# Patient Record
Sex: MALE | Race: WHITE | NOT HISPANIC OR LATINO | URBAN - METROPOLITAN AREA
[De-identification: names, ages, dates, MRNs, and addresses within clinical notes are randomized per-mention and may not be internally consistent; named-entity substitution may affect disease eponyms.]

---

## 2020-02-25 ENCOUNTER — EMERGENCY (EMERGENCY)
Facility: HOSPITAL | Age: 60
LOS: 1 days | Discharge: HOME | End: 2020-02-25
Attending: EMERGENCY MEDICINE
Payer: COMMERCIAL

## 2020-02-25 VITALS
OXYGEN SATURATION: 99 % | DIASTOLIC BLOOD PRESSURE: 81 MMHG | SYSTOLIC BLOOD PRESSURE: 109 MMHG | RESPIRATION RATE: 18 BRPM | HEART RATE: 65 BPM

## 2020-02-25 VITALS — HEIGHT: 66 IN | WEIGHT: 169.98 LBS

## 2020-02-25 LAB
ALBUMIN SERPL ELPH-MCNC: 4.2 G/DL — SIGNIFICANT CHANGE UP (ref 3.3–5)
ALP SERPL-CCNC: 60 U/L — SIGNIFICANT CHANGE UP (ref 40–120)
ALT FLD-CCNC: 23 U/L — SIGNIFICANT CHANGE UP (ref 10–45)
ANION GAP SERPL CALC-SCNC: 14 MMOL/L — SIGNIFICANT CHANGE UP (ref 5–17)
APTT BLD: 28.9 SEC — SIGNIFICANT CHANGE UP (ref 27.5–36.3)
AST SERPL-CCNC: 23 U/L — SIGNIFICANT CHANGE UP (ref 10–40)
BASOPHILS # BLD AUTO: 0.03 K/UL — SIGNIFICANT CHANGE UP (ref 0–0.2)
BASOPHILS NFR BLD AUTO: 0.6 % — SIGNIFICANT CHANGE UP (ref 0–2)
BILIRUB SERPL-MCNC: 0.3 MG/DL — SIGNIFICANT CHANGE UP (ref 0.2–1.2)
BUN SERPL-MCNC: 17 MG/DL — SIGNIFICANT CHANGE UP (ref 7–23)
CALCIUM SERPL-MCNC: 9.2 MG/DL — SIGNIFICANT CHANGE UP (ref 8.4–10.5)
CHLORIDE SERPL-SCNC: 102 MMOL/L — SIGNIFICANT CHANGE UP (ref 96–108)
CO2 SERPL-SCNC: 23 MMOL/L — SIGNIFICANT CHANGE UP (ref 22–31)
CREAT SERPL-MCNC: 1.26 MG/DL — SIGNIFICANT CHANGE UP (ref 0.5–1.3)
EOSINOPHIL # BLD AUTO: 0.08 K/UL — SIGNIFICANT CHANGE UP (ref 0–0.5)
EOSINOPHIL NFR BLD AUTO: 1.6 % — SIGNIFICANT CHANGE UP (ref 0–6)
FLU A RESULT: SIGNIFICANT CHANGE UP
FLU A RESULT: SIGNIFICANT CHANGE UP
FLUAV AG NPH QL: SIGNIFICANT CHANGE UP
FLUBV AG NPH QL: DETECTED
GLUCOSE SERPL-MCNC: 132 MG/DL — HIGH (ref 70–99)
HCT VFR BLD CALC: 46.6 % — SIGNIFICANT CHANGE UP (ref 39–50)
HGB BLD-MCNC: 15.4 G/DL — SIGNIFICANT CHANGE UP (ref 13–17)
IMM GRANULOCYTES NFR BLD AUTO: 0.4 % — SIGNIFICANT CHANGE UP (ref 0–1.5)
INR BLD: 1.11 RATIO — SIGNIFICANT CHANGE UP (ref 0.88–1.16)
LYMPHOCYTES # BLD AUTO: 1.16 K/UL — SIGNIFICANT CHANGE UP (ref 1–3.3)
LYMPHOCYTES # BLD AUTO: 23.5 % — SIGNIFICANT CHANGE UP (ref 13–44)
MAGNESIUM SERPL-MCNC: 2.2 MG/DL — SIGNIFICANT CHANGE UP (ref 1.6–2.6)
MCHC RBC-ENTMCNC: 30.7 PG — SIGNIFICANT CHANGE UP (ref 27–34)
MCHC RBC-ENTMCNC: 33 GM/DL — SIGNIFICANT CHANGE UP (ref 32–36)
MCV RBC AUTO: 93 FL — SIGNIFICANT CHANGE UP (ref 80–100)
MONOCYTES # BLD AUTO: 0.94 K/UL — HIGH (ref 0–0.9)
MONOCYTES NFR BLD AUTO: 19.1 % — HIGH (ref 2–14)
NEUTROPHILS # BLD AUTO: 2.7 K/UL — SIGNIFICANT CHANGE UP (ref 1.8–7.4)
NEUTROPHILS NFR BLD AUTO: 54.8 % — SIGNIFICANT CHANGE UP (ref 43–77)
NRBC # BLD: 0 /100 WBCS — SIGNIFICANT CHANGE UP (ref 0–0)
NT-PROBNP SERPL-SCNC: 616 PG/ML — HIGH (ref 0–300)
PLATELET # BLD AUTO: 153 K/UL — SIGNIFICANT CHANGE UP (ref 150–400)
POTASSIUM SERPL-MCNC: 3.6 MMOL/L — SIGNIFICANT CHANGE UP (ref 3.5–5.3)
POTASSIUM SERPL-SCNC: 3.6 MMOL/L — SIGNIFICANT CHANGE UP (ref 3.5–5.3)
PROT SERPL-MCNC: 6.9 G/DL — SIGNIFICANT CHANGE UP (ref 6–8.3)
PROTHROM AB SERPL-ACNC: 12.7 SEC — SIGNIFICANT CHANGE UP (ref 10–12.9)
RBC # BLD: 5.01 M/UL — SIGNIFICANT CHANGE UP (ref 4.2–5.8)
RBC # FLD: 12.8 % — SIGNIFICANT CHANGE UP (ref 10.3–14.5)
RSV RESULT: SIGNIFICANT CHANGE UP
RSV RNA RESP QL NAA+PROBE: SIGNIFICANT CHANGE UP
SODIUM SERPL-SCNC: 139 MMOL/L — SIGNIFICANT CHANGE UP (ref 135–145)
TROPONIN T, HIGH SENSITIVITY RESULT: 6 NG/L — SIGNIFICANT CHANGE UP (ref 0–51)
TROPONIN T, HIGH SENSITIVITY RESULT: 8 NG/L — SIGNIFICANT CHANGE UP (ref 0–51)
WBC # BLD: 4.93 K/UL — SIGNIFICANT CHANGE UP (ref 3.8–10.5)
WBC # FLD AUTO: 4.93 K/UL — SIGNIFICANT CHANGE UP (ref 3.8–10.5)

## 2020-02-25 PROCEDURE — 93010 ELECTROCARDIOGRAM REPORT: CPT | Mod: 59

## 2020-02-25 PROCEDURE — 99285 EMERGENCY DEPT VISIT HI MDM: CPT

## 2020-02-25 PROCEDURE — 85730 THROMBOPLASTIN TIME PARTIAL: CPT

## 2020-02-25 PROCEDURE — 80053 COMPREHEN METABOLIC PANEL: CPT

## 2020-02-25 PROCEDURE — 75574 CT ANGIO HRT W/3D IMAGE: CPT

## 2020-02-25 PROCEDURE — 87631 RESP VIRUS 3-5 TARGETS: CPT

## 2020-02-25 PROCEDURE — 83735 ASSAY OF MAGNESIUM: CPT

## 2020-02-25 PROCEDURE — 93005 ELECTROCARDIOGRAM TRACING: CPT

## 2020-02-25 PROCEDURE — 83880 ASSAY OF NATRIURETIC PEPTIDE: CPT

## 2020-02-25 PROCEDURE — 84484 ASSAY OF TROPONIN QUANT: CPT

## 2020-02-25 PROCEDURE — 71046 X-RAY EXAM CHEST 2 VIEWS: CPT | Mod: 26

## 2020-02-25 PROCEDURE — 75574 CT ANGIO HRT W/3D IMAGE: CPT | Mod: 26

## 2020-02-25 PROCEDURE — 71046 X-RAY EXAM CHEST 2 VIEWS: CPT

## 2020-02-25 PROCEDURE — 85027 COMPLETE CBC AUTOMATED: CPT

## 2020-02-25 PROCEDURE — 99284 EMERGENCY DEPT VISIT MOD MDM: CPT | Mod: 25

## 2020-02-25 PROCEDURE — 85610 PROTHROMBIN TIME: CPT

## 2020-02-25 RX ORDER — SODIUM CHLORIDE 9 MG/ML
1000 INJECTION INTRAMUSCULAR; INTRAVENOUS; SUBCUTANEOUS ONCE
Refills: 0 | Status: COMPLETED | OUTPATIENT
Start: 2020-02-25 | End: 2020-02-25

## 2020-02-25 RX ORDER — METOPROLOL TARTRATE 50 MG
100 TABLET ORAL ONCE
Refills: 0 | Status: COMPLETED | OUTPATIENT
Start: 2020-02-25 | End: 2020-02-25

## 2020-02-25 RX ADMIN — Medication 100 MILLIGRAM(S): at 11:53

## 2020-02-25 RX ADMIN — SODIUM CHLORIDE 1000 MILLILITER(S): 9 INJECTION INTRAMUSCULAR; INTRAVENOUS; SUBCUTANEOUS at 15:53

## 2020-02-25 RX ADMIN — SODIUM CHLORIDE 1000 MILLILITER(S): 9 INJECTION INTRAMUSCULAR; INTRAVENOUS; SUBCUTANEOUS at 11:53

## 2020-02-25 NOTE — ED PROVIDER NOTE - ATTENDING CONTRIBUTION TO CARE
attending Adam: 59yM  no PMH BIBEMS from home after syncopal episode at home. Several days of generalized weakness. Today with episode of dizziness and diaphoresis, syncopized twice in armchair, no fall. No seizure like activity. Witnessed by wife. Reports increased stress recently related to work. Seen at urgent care yesterday for weakness and diagnosed with viral syndrome. +family h/o MI in his mother in her 70s. Last stress test 2 years ago. No chest pain. Given ASA and IVF by EMS with improved symptoms. Will obtain ekg, place on tele, labs, cxr, reassess.

## 2020-02-25 NOTE — ED PROVIDER NOTE - OBJECTIVE STATEMENT
60 y/o male with no PMHx with no PSx no daily med use brought in via EMS after witnessed syncopal event by wife x 2 episodes. Patient stated he felt dizzy prior to the event and wife reported pt was slumped in chair unconscious for 2-3 minutes. Patient has been feeling generally unwell for the last few days with mild non productive cough and body aches. Patient seen by Urgent Care who stated pt may have URI. Patient stated he has been stressed with work as he is a . Last stress test was 2 years ago with [place] cardiologist and mother  of MI at 78 years old. EMS was concerned for STEMI therefore pt given ASA 325mg en route and 500cc of IVF. Patient denied CP, SOB, ANTHONY, abdominal pain, N/V/D, fever chills, productive cough, recent travel or prolonged sedentary period, peripheral edema, similar event in the past, back pain, neck pain, smoking, alcohol use, urinary complaints

## 2020-02-25 NOTE — ED PROVIDER NOTE - CARE PROVIDER_API CALL
Johan Borrego (DO)  Cardiology; Internal Medicine  [address] [place] [address], [address]  [place] [address]  Phone: [phone]  Fax: [phone]  Follow Up Time: 4-6 Days

## 2020-02-25 NOTE — ED PROVIDER NOTE - PATIENT PORTAL LINK FT
You can access the FollowMyHealth Patient Portal offered by [facility] by registering at the following website: [email or web address]. By joining [facility]’s FollowMyHealth portal, you will also be able to view your health information using other applications (apps) compatible with our system.

## 2020-02-25 NOTE — ED ADULT NURSE REASSESSMENT NOTE - NS ED NURSE REASSESS COMMENT FT1
Pt observed sitting up in stretcher conversing with RN without difficulty. Breathing spontaneous and unlabored. Denies CP, SOB dizziness at this time. Maintained on continuous pulse ox and cardiac monitor, NSR.  No acute distress noted with wife at bedside.

## 2020-02-25 NOTE — ED ADULT NURSE NOTE - OBJECTIVE STATEMENT
Pt 58 y/o male AxOx3 presents to ED via EMS s/p 2 syncopal episodes at home witnessed by wife. Pt endorses remembering being very diaphoretic before episodes. Wife witnessed events and states that 10 minutes  each episode. Pt denies CP, SOB or dizziness at this time. [facility] and gave patient 324mg of Aspirin before arrival. Upon assessment, abdomen soft and nontender, +strong peripheral pulses, moving all extremities without difficulty, lungs clear. Pt is well appearing, speaking full sentences without difficulty. Breathing spontaneous and unlabored. Pt placed on continuous pulse ox and cardiac monitor, NSR noted. Safety and comfort measures initiated- bed placed in lowest position and side rails raised. Pt oriented to call bell system. Pt 58 y/o male AxOx3 presents to ED via EMS s/p 2 syncopal episodes at home witnessed by wife. Pt endorses remembering being very diaphoretic before episodes. Wife witnessed events and states that pt suddenly slumped down in chair, became alert again. Then 10 minute later episode #2 happened with similar symptoms. Pt denies CP, SOB or dizziness at this time. EMS placed 18G IV in left AC and gave patient 324mg of Aspirin before arrival. Upon assessment, abdomen soft and nontender, +strong peripheral pulses, moving all extremities without difficulty, lungs clear. Pt is well appearing, speaking full sentences without difficulty. Breathing spontaneous and unlabored. Pt placed on continuous pulse ox and cardiac monitor, NSR noted. Pt denies CP, SOB, HA, vision changes, n/v/d, chills, abdominal pain. Safety and comfort measures initiated- bed placed in lowest position and side rails raised. Pt oriented to call bell system.

## 2020-02-25 NOTE — ED PROVIDER NOTE - NSFOLLOWUPINSTRUCTIONS_ED_ALL_ED_FT
Follow up with your Primary Care Physician within the next 2-3 days  Bring a copy of your test results with you to your appointment  Continue your current medication regimen  Return to the Emergency Room if you experience new or worsening symptoms abdominal pain, passing out, nausea, vomiting, fever, cough, chest pain, shortness of breath  Establish care with cardiologist Dr. Johan Borrego [address] [place] [address], [address] [place] [address]  Phone: [phone]

## 2021-04-21 NOTE — ED PROVIDER NOTE - CARDIOVASCULAR [-], MLM
Implemented All Universal Safety Interventions:  [place] to call system. Call bell, personal items and telephone within reach. Instruct patient to call for assistance. Room bathroom lighting operational. Non-slip footwear when patient is off stretcher. Physically safe environment: no spills, clutter or unnecessary equipment. Stretcher in lowest position, wheels locked, appropriate side rails in place. no palpitations/no peripheral edema/no chest pain

## 2022-12-26 ENCOUNTER — NON-APPOINTMENT (OUTPATIENT)
Age: 62
End: 2022-12-26

## 2024-03-14 ENCOUNTER — NON-APPOINTMENT (OUTPATIENT)
Age: 64
End: 2024-03-14

## 2024-03-14 ENCOUNTER — APPOINTMENT (OUTPATIENT)
Dept: CARDIOLOGY | Facility: CLINIC | Age: 64
End: 2024-03-14
Payer: COMMERCIAL

## 2024-03-14 VITALS
HEIGHT: 68 IN | WEIGHT: 192 LBS | SYSTOLIC BLOOD PRESSURE: 132 MMHG | OXYGEN SATURATION: 98 % | DIASTOLIC BLOOD PRESSURE: 86 MMHG | BODY MASS INDEX: 29.1 KG/M2 | HEART RATE: 81 BPM | RESPIRATION RATE: 17 BRPM

## 2024-03-14 VITALS — DIASTOLIC BLOOD PRESSURE: 82 MMHG | SYSTOLIC BLOOD PRESSURE: 132 MMHG

## 2024-03-14 DIAGNOSIS — Z86.79 OTHER SPECIFIED POSTPROCEDURAL STATES: ICD-10-CM

## 2024-03-14 DIAGNOSIS — I25.84 ATHEROSCLEROTIC HEART DISEASE OF NATIVE CORONARY ARTERY W/OUT ANGINA PECTORIS: ICD-10-CM

## 2024-03-14 DIAGNOSIS — Z80.3 FAMILY HISTORY OF MALIGNANT NEOPLASM OF BREAST: ICD-10-CM

## 2024-03-14 DIAGNOSIS — R55 SYNCOPE AND COLLAPSE: ICD-10-CM

## 2024-03-14 DIAGNOSIS — Z78.9 OTHER SPECIFIED HEALTH STATUS: ICD-10-CM

## 2024-03-14 DIAGNOSIS — I07.1 RHEUMATIC TRICUSPID INSUFFICIENCY: ICD-10-CM

## 2024-03-14 DIAGNOSIS — I48.0 PAROXYSMAL ATRIAL FIBRILLATION: ICD-10-CM

## 2024-03-14 DIAGNOSIS — Z82.49 FAMILY HISTORY OF ISCHEMIC HEART DISEASE AND OTHER DISEASES OF THE CIRCULATORY SYSTEM: ICD-10-CM

## 2024-03-14 DIAGNOSIS — I25.10 ATHEROSCLEROTIC HEART DISEASE OF NATIVE CORONARY ARTERY W/OUT ANGINA PECTORIS: ICD-10-CM

## 2024-03-14 DIAGNOSIS — I34.0 NONRHEUMATIC MITRAL (VALVE) INSUFFICIENCY: ICD-10-CM

## 2024-03-14 DIAGNOSIS — Z80.0 FAMILY HISTORY OF MALIGNANT NEOPLASM OF DIGESTIVE ORGANS: ICD-10-CM

## 2024-03-14 DIAGNOSIS — Z83.3 FAMILY HISTORY OF DIABETES MELLITUS: ICD-10-CM

## 2024-03-14 DIAGNOSIS — Z98.890 OTHER SPECIFIED POSTPROCEDURAL STATES: ICD-10-CM

## 2024-03-14 DIAGNOSIS — G47.33 OBSTRUCTIVE SLEEP APNEA (ADULT) (PEDIATRIC): ICD-10-CM

## 2024-03-14 PROBLEM — Z00.00 ENCOUNTER FOR PREVENTIVE HEALTH EXAMINATION: Status: ACTIVE | Noted: 2024-03-14

## 2024-03-14 PROCEDURE — 93000 ELECTROCARDIOGRAM COMPLETE: CPT

## 2024-03-14 PROCEDURE — 99205 OFFICE O/P NEW HI 60 MIN: CPT | Mod: 25

## 2024-03-14 NOTE — PHYSICAL EXAM
[Well Developed] : well developed [Well Nourished] : well nourished [No Acute Distress] : no acute distress [Normal Conjunctiva] : normal conjunctiva [Normal Venous Pressure] : normal venous pressure [No Carotid Bruit] : no carotid bruit [Normal] : normal S1, S2, no murmur, no rub, no gallop [Normal S1, S2] : normal S1, S2 [No Murmur] : no murmur [No Rub] : no rub [No Gallop] : no gallop [Clear Lung Fields] : clear lung fields [Good Air Entry] : good air entry [Soft] : abdomen soft [No Respiratory Distress] : no respiratory distress  [Non Tender] : non-tender [No Masses/organomegaly] : no masses/organomegaly [Normal Bowel Sounds] : normal bowel sounds [Normal Gait] : normal gait [No Edema] : no edema [No Cyanosis] : no cyanosis [No Clubbing] : no clubbing [No Varicosities] : no varicosities [No Skin Lesions] : no skin lesions [No Rash] : no rash [Moves all extremities] : moves all extremities [No Focal Deficits] : no focal deficits [Normal Speech] : normal speech [Alert and Oriented] : alert and oriented [Normal memory] : normal memory

## 2024-03-17 NOTE — ASSESSMENT
[FreeTextEntry1] : ============================ Paroxysmal atrial fibrillation, status post cardioversion in November 2023 and ablation in January 2024 at [facility] by Dr. Orlando Rico.  Coronary artery calcification; calcification in the descending aorta  Possible hyperlipidemia.  Mild to moderate mitral insufficiency; mild tricuspid valve insufficiency.  Obstructive sleep apnea.  History of vasovagal syncope

## 2024-03-17 NOTE — DISCUSSION/SUMMARY
[EKG obtained to assist in diagnosis and management of assessed problem(s)] : EKG obtained to assist in diagnosis and management of assessed problem(s) [FreeTextEntry1] : EKG today shows:  Sinus Rhythm at 81 bpm.  Mild IVCD, otherwise unremarkable tracing  PLAN: 1.  Paroxysmal atrial fibrillation, s/p cardioversion in November 2023 and ablation in January 2024 at [facility] by Dr. Orlando Medina.   -   Will request copies of the inpatient records from [place] re cardioversion last November and ablation this past January 2 for review. -   At present, the reason for the atrial fibrillation is unclear; this might be related to obstructive sleep apnea and might simply be related to aging. -   He will keep a planned appointment with Dr. Medina in the spring.   -   Will request a copy of most recent blood work from Dr. Maciel.  2.   Coronary artery calcification; calcification in the descending aorta -   I explained to the patient and his wife that this finding is consistent with atherosclerosis affecting the coronary arteries.  At present, he is active without symptoms and does exercise regularly. -   he may continue his usual activities. -   I recommended that he start on enteric-coated aspirin at 81 mg daily given the finding of coronary atherosclerosis; may also have some utility should A-fib recur. -   At his next visit, after I have had the opportunity to further review the records from [facility], we will discuss further workup, either by cardiac CT angiogram or potentially nuclear stress testing. -   I explained that he likely should be treated with a statin based simply on the presence of atherosclerosis but will wait until I have an opportunity to review the blood work from Dr. Maciel.  3.  Possible hyperlipidemia. -   As above, will review recent blood work and decide on adding statin in the future.  4.   Mild to moderate mitral insufficiency; mild tricuspid valve insufficiency --   noted incidentally on echo at [facility].  Remains active and asymptomatic with preserved LV function.  No specific treatment needed at this time.  5.   Obstructive sleep apnea. -   Continue follow-up with pulmonary physician.  His wife reports that sometimes that she is concerned of a leak regarding his apparatus.  I advised that this would be best addressed by the pulmonary doctor and the people provide his positive pressure mask.  6.  History of vasovagal syncope  85 minutes spent on today's office visit including review of records provided by patient.  The patient will return for a visit in  6-8 weeks.

## 2024-03-17 NOTE — HISTORY OF PRESENT ILLNESS
[FreeTextEntry1] : He is 63 years old.  He has a hx. of GERDA and uses a CPAP mask.  In December of last year, he was aware of shortness of breath and saw Dr. Shasha Maciel.  EKG showed new atrial fibrillation.    He was referred to Dr. Orlando Medina, an electrophysiologist at [facility] and was started on Eliquis.  Cardioversion was performed on November 3, 2023 with restoration of sinus rhythm.  Approximately 2 weeks later, atrial fibrillation recurred.  A. fib ablation was performed at [facility] on 2024.  He has a history of vasovagal events, and recalls suffering on after placement of the IV line for ablation and subsequent phlebotomy.  In the initial post-procedural period, he had a few episodes of PAF which have since resolved completely.  He has been asymptomatic now for several weeks.  Anticoagulation with Eliquis was discontinued by Dr. Medina.  At present, he continues his usual activities without limitation.  He describes no exertional chest discomfort.  He has had 2 episodes of left-sided chest tightness when lying in bed, maybe half hour after eating.  He suspects this may be due to a GI cause; while he likes spicy foods, of late he has been less tolerant of them.  He has a history of vasovagal syncope over the years, commonly in the setting of blood draws; he approximates that 10 to 15% of the time.  He has no history of hypertension.  Notes he brings with him describe hyperlipidemia but is not on a med at this time.  He reports no history of diabetes.  He describes no history of cigarette use.  His mother  at age 78, likely of a heart attack 1 day after having had a cardiac stress test to evaluate dyspnea.  A maternal brother  in his 70s of a myocardial infarction.  A CT of the heart with contrast for 3D morphology was performed at [facility] in January.  It described prominent epicardial fat.  No cardiac abnormalities were described.    Pulmonary disease CT described a trace pericardial effusion, mild aortic valve calcification and moderate coronary artery calcification.  Mild dilatation of the ascending aorta was described at 3.8 cm.  Mild calcification was seen in the descending aorta.  Transthoracic echo on 2023 described LVEF of 50-55%.  RV function was normal.  The left atrial cavity was moderately dilated with a left atrial volume index of 41 mm/m.  On that study, mild mitral valve insufficiency was described.  Mild tricuspid valve insufficiency was described.  On that study the aortic root size was normal as was the ascending aorta.   MENDOZA on 11/3/2023 showed LVEF of 50 to 55%.  Mild to moderate mitral valve insufficiency was described as well as mild tricuspid valve insufficiency.

## 2024-03-17 NOTE — DISCUSSION/SUMMARY
[EKG obtained to assist in diagnosis and management of assessed problem(s)] : EKG obtained to assist in diagnosis and management of assessed problem(s) [FreeTextEntry1] : EKG today shows:  Sinus Rhythm at 81 bpm.  Mild IVCD, otherwise unremarkable tracing  PLAN: 1.  Paroxysmal atrial fibrillation, s/p cardioversion in November 2023 and ablation in January 2024 at [facility] by Dr. Orlando Medina.   -   Will request copies of the inpatient records from [place] re cardioversion last November and ablation this past January 2 for review. -   At present, the reason for the atrial fibrillation is unclear; this might be related to obstructive sleep apnea and might simply be related to aging. -   He will keep a planned appointment with Dr. Medina in the spring.   -   Will request a copy of most recent blood work from Dr. Maciel.  2.   Coronary artery calcification; calcification in the descending aorta -   I explained to the patient and his wife that this finding is consistent with atherosclerosis affecting the coronary arteries.  At present, he is active without symptoms and does exercise regularly. -   he may continue his usual activities. -   I recommended that he start on enteric-coated aspirin at 81 mg daily given the finding of coronary atherosclerosis; may also have some utility should A-fib recur. -   At his next visit, after I have had the opportunity to further review the records from [facility], we will discuss further workup, either by cardiac CT angiogram or potentially nuclear stress testing. -   I explained that he likely should be treated with a statin based simply on the presence of atherosclerosis but will wait until I have an opportunity to review the blood work from Dr. Maciel.  3.  Possible hyperlipidemia. -   As above, will review recent blood work and decide on adding statin in the future.  4.   Mild to moderate mitral insufficiency; mild tricuspid valve insufficiency --   noted incidentally on echo at [facility].  Remains active and asymptomatic with preserved LV function.  No specific treatment needed at this time.  5.   Obstructive sleep apnea. -   Continue follow-up with pulmonary physician.  His wife reports that sometimes that she is concerned of a leak regarding his apparatus.  I advised that this would be best addressed by the pulmonary doctor and the people provide his positive pressure mask.  6.  History of vasovagal syncope  85 minutes spent on today's office visit including review of records provided by patient.  The patient will return for a visit in  6-8 weeks.

## 2024-03-17 NOTE — REASON FOR VISIT
[FreeTextEntry1] :   Stoney Lopez presents today for cardiac evaluation re paroxysmal atrial fibrillation s/p ablation.

## 2024-09-04 ENCOUNTER — TRANSCRIPTION ENCOUNTER (OUTPATIENT)
Age: 64
End: 2024-09-04

## 2024-09-04 ENCOUNTER — APPOINTMENT (OUTPATIENT)
Dept: CARDIOLOGY | Facility: CLINIC | Age: 64
End: 2024-09-04
Payer: COMMERCIAL

## 2024-09-04 ENCOUNTER — NON-APPOINTMENT (OUTPATIENT)
Age: 64
End: 2024-09-04

## 2024-09-04 VITALS
WEIGHT: 189 LBS | BODY MASS INDEX: 28.74 KG/M2 | HEART RATE: 80 BPM | SYSTOLIC BLOOD PRESSURE: 134 MMHG | OXYGEN SATURATION: 98 % | DIASTOLIC BLOOD PRESSURE: 80 MMHG

## 2024-09-04 DIAGNOSIS — I48.0 PAROXYSMAL ATRIAL FIBRILLATION: ICD-10-CM

## 2024-09-04 DIAGNOSIS — Z98.890 OTHER SPECIFIED POSTPROCEDURAL STATES: ICD-10-CM

## 2024-09-04 DIAGNOSIS — E78.5 HYPERLIPIDEMIA, UNSPECIFIED: ICD-10-CM

## 2024-09-04 DIAGNOSIS — G47.33 OBSTRUCTIVE SLEEP APNEA (ADULT) (PEDIATRIC): ICD-10-CM

## 2024-09-04 DIAGNOSIS — I25.10 ATHEROSCLEROTIC HEART DISEASE OF NATIVE CORONARY ARTERY W/OUT ANGINA PECTORIS: ICD-10-CM

## 2024-09-04 DIAGNOSIS — Z86.79 OTHER SPECIFIED POSTPROCEDURAL STATES: ICD-10-CM

## 2024-09-04 PROCEDURE — 93000 ELECTROCARDIOGRAM COMPLETE: CPT

## 2024-09-04 PROCEDURE — 99215 OFFICE O/P EST HI 40 MIN: CPT | Mod: 25

## 2024-09-04 RX ORDER — ATORVASTATIN CALCIUM 10 MG/1
10 TABLET, FILM COATED ORAL
Qty: 90 | Refills: 3 | Status: ACTIVE | COMMUNITY
Start: 2024-09-04 | End: 1900-01-01

## 2024-09-04 NOTE — DISCUSSION/SUMMARY
[EKG obtained to assist in diagnosis and management of assessed problem(s)] : EKG obtained to assist in diagnosis and management of assessed problem(s) [FreeTextEntry1] : EKG today shows:  Sinus Rhythm at 80 bpm.  Normal intervals and axis.  NS ST/T abn; no significant change.  PLAN: 1.  PAF s/p cardioversion in 11/2023 and ablation in 1/2024 at [facility] by Dr. Orlando Medina.   -   remains in SR.  Had favorable f/u with Dr. Medina; remains off meds.  2.   Coronary artery calcification; calcification in the descending aorta -   we discussed his recent lipid profile.  Total cholesterol was approximately 223 and LDL cholesterol was 123 with an HDL of 51 and normal triglycerides. -   I recommended that he start atorvastatin.  We will start at a low dose of 10 mg daily and I explained we may need to increase this over time depending upon his response.  He should also continue on a low-fat diet. -   We will have him recheck a lipid panel as well as hepatic chemistries and creatine kinase level in 2 months.  I explained to him that the medication can cause side effects involving either liver or skeletal muscle, the reason for checking the blood tests.  He will call me should he encounter any problems. -   He should also take low-dose enteric-coated aspirin at 81 mg daily. -   discussed CT for calcium scoring or possible cardiac CTA; will inquire if his insurance will cover this.  3.   Mild to moderate mitral insufficiency; mild tricuspid valve insufficiency --   noted incidentally on echo at [facility].  Remains active and asymptomatic with preserved LV function.  No specific treatment needed at this time.  4.   Obstructive sleep apnea. -   Continue follow-up with pulmonary physician and use of appliance.  5.  Remote history of vasovagal syncope, no recurrence  40 minutes spent on today's office visit.  The patient will return for a visit in 6 mos.

## 2024-09-04 NOTE — HISTORY OF PRESENT ILLNESS
[FreeTextEntry1] : He has a hx. of GERDA and uses a CPAP mask.  In  he saw Dr. Shasha Maciel re new SOB and EKG showed new atrial fibrillation.  He saw Dr. Orlando Medina of EP at [facility]; he was started on Eliquis & cardioversion was performed on 11/3/23 with restoration of sinus rhythm.  Two 2 weeks later, AF recurred; ablation was performed at [facility] on 2024.  Anticoagulation with Eliquis was later discontinued by Dr. Medina. He has a history of vasovagal events, and recalls suffering on after placement of the IV line for ablation and after phlebotomy.  Pulmonary disease CT described a trace pericardial effusion, mild aortic valve calcification and moderate coronary artery calcification.  Mild dilatation of the ascending aorta was described at 3.8 cm.  Mild calcification was seen in the descending aorta.  Notes he brings with him describe hyperlipidemia but is not on a med at this time.  He reports no history of diabetes.    His mother  at age 78, likely of a heart attack 1 day after having had a cardiac stress test to evaluate dyspnea.  A maternal brother  in his 70s of a myocardial infarction.  Transthoracic echo on 2023 described LVEF of 50-55%.  RV function was normal.  The left atrial cavity was moderately dilated with a left atrial volume index of 41 mm/m.  On that study, mild mitral valve insufficiency was described.  Mild tricuspid valve insufficiency was described.  On that study the aortic root size was normal as was the ascending aorta.   MENDOZA on 11/3/2023 showed LVEF of 50 to 55%.  Mild to moderate mitral valve insufficiency was described as well as mild tricuspid valve insufficiency.  As he returns, he has been well.  He continues his usual activities without limitation.  He reports no episodes of exertional chest discomfort or dyspnea.  There have been no palpitations.  He describes no lightheadedness or loss of consciousness.  He reports no orthopnea or PND.  He is compliant with his appliance for GERDA which she seems to tolerate well.  He saw Dr. Maciel in May and blood work was checked.

## 2025-05-23 ENCOUNTER — NON-APPOINTMENT (OUTPATIENT)
Age: 65
End: 2025-05-23

## 2025-05-23 ENCOUNTER — APPOINTMENT (OUTPATIENT)
Dept: CARDIOLOGY | Facility: CLINIC | Age: 65
End: 2025-05-23
Payer: COMMERCIAL

## 2025-05-23 VITALS
DIASTOLIC BLOOD PRESSURE: 80 MMHG | HEART RATE: 79 BPM | OXYGEN SATURATION: 99 % | SYSTOLIC BLOOD PRESSURE: 130 MMHG | BODY MASS INDEX: 29.97 KG/M2 | WEIGHT: 197.13 LBS

## 2025-05-23 DIAGNOSIS — E78.5 HYPERLIPIDEMIA, UNSPECIFIED: ICD-10-CM

## 2025-05-23 DIAGNOSIS — Z86.79 OTHER SPECIFIED POSTPROCEDURAL STATES: ICD-10-CM

## 2025-05-23 DIAGNOSIS — Z98.890 OTHER SPECIFIED POSTPROCEDURAL STATES: ICD-10-CM

## 2025-05-23 DIAGNOSIS — I48.0 PAROXYSMAL ATRIAL FIBRILLATION: ICD-10-CM

## 2025-05-23 PROCEDURE — 93000 ELECTROCARDIOGRAM COMPLETE: CPT

## 2025-05-23 PROCEDURE — 99215 OFFICE O/P EST HI 40 MIN: CPT | Mod: 25

## 2025-07-10 ENCOUNTER — NON-APPOINTMENT (OUTPATIENT)
Age: 65
End: 2025-07-10